# Patient Record
Sex: MALE | Race: WHITE | NOT HISPANIC OR LATINO | Employment: FULL TIME | URBAN - METROPOLITAN AREA
[De-identification: names, ages, dates, MRNs, and addresses within clinical notes are randomized per-mention and may not be internally consistent; named-entity substitution may affect disease eponyms.]

---

## 2022-08-24 ENCOUNTER — OFFICE VISIT (OUTPATIENT)
Dept: INTERNAL MEDICINE | Facility: CLINIC | Age: 52
End: 2022-08-24
Payer: COMMERCIAL

## 2022-08-24 VITALS
WEIGHT: 214.5 LBS | BODY MASS INDEX: 34.47 KG/M2 | HEART RATE: 60 BPM | DIASTOLIC BLOOD PRESSURE: 88 MMHG | OXYGEN SATURATION: 98 % | TEMPERATURE: 97 F | HEIGHT: 66 IN | SYSTOLIC BLOOD PRESSURE: 132 MMHG

## 2022-08-24 DIAGNOSIS — E29.1 MALE HYPOGONADISM: ICD-10-CM

## 2022-08-24 DIAGNOSIS — Z12.5 SCREENING FOR PROSTATE CANCER: ICD-10-CM

## 2022-08-24 DIAGNOSIS — K29.00 OTHER ACUTE GASTRITIS WITHOUT HEMORRHAGE: ICD-10-CM

## 2022-08-24 DIAGNOSIS — Z00.00 ANNUAL PHYSICAL EXAM: Primary | ICD-10-CM

## 2022-08-24 DIAGNOSIS — M72.2 PLANTAR FASCIITIS: ICD-10-CM

## 2022-08-24 DIAGNOSIS — E78.2 MODERATE MIXED HYPERLIPIDEMIA NOT REQUIRING STATIN THERAPY: ICD-10-CM

## 2022-08-24 DIAGNOSIS — Z11.3 SCREENING FOR STD (SEXUALLY TRANSMITTED DISEASE): ICD-10-CM

## 2022-08-24 DIAGNOSIS — Z12.11 SCREENING FOR COLON CANCER: ICD-10-CM

## 2022-08-24 PROCEDURE — 3079F PR MOST RECENT DIASTOLIC BLOOD PRESSURE 80-89 MM HG: ICD-10-PCS | Mod: CPTII,S$GLB,, | Performed by: FAMILY MEDICINE

## 2022-08-24 PROCEDURE — 99999 PR PBB SHADOW E&M-NEW PATIENT-LVL IV: CPT | Mod: PBBFAC,,, | Performed by: FAMILY MEDICINE

## 2022-08-24 PROCEDURE — 99386 PREV VISIT NEW AGE 40-64: CPT | Mod: S$GLB,,, | Performed by: FAMILY MEDICINE

## 2022-08-24 PROCEDURE — 1160F RVW MEDS BY RX/DR IN RCRD: CPT | Mod: CPTII,S$GLB,, | Performed by: FAMILY MEDICINE

## 2022-08-24 PROCEDURE — 1159F PR MEDICATION LIST DOCUMENTED IN MEDICAL RECORD: ICD-10-PCS | Mod: CPTII,S$GLB,, | Performed by: FAMILY MEDICINE

## 2022-08-24 PROCEDURE — 3008F PR BODY MASS INDEX (BMI) DOCUMENTED: ICD-10-PCS | Mod: CPTII,S$GLB,, | Performed by: FAMILY MEDICINE

## 2022-08-24 PROCEDURE — 3008F BODY MASS INDEX DOCD: CPT | Mod: CPTII,S$GLB,, | Performed by: FAMILY MEDICINE

## 2022-08-24 PROCEDURE — 99999 PR PBB SHADOW E&M-NEW PATIENT-LVL IV: ICD-10-PCS | Mod: PBBFAC,,, | Performed by: FAMILY MEDICINE

## 2022-08-24 PROCEDURE — 1160F PR REVIEW ALL MEDS BY PRESCRIBER/CLIN PHARMACIST DOCUMENTED: ICD-10-PCS | Mod: CPTII,S$GLB,, | Performed by: FAMILY MEDICINE

## 2022-08-24 PROCEDURE — 99386 PR PREVENTIVE VISIT,NEW,40-64: ICD-10-PCS | Mod: S$GLB,,, | Performed by: FAMILY MEDICINE

## 2022-08-24 PROCEDURE — 3075F PR MOST RECENT SYSTOLIC BLOOD PRESS GE 130-139MM HG: ICD-10-PCS | Mod: CPTII,S$GLB,, | Performed by: FAMILY MEDICINE

## 2022-08-24 PROCEDURE — 3079F DIAST BP 80-89 MM HG: CPT | Mod: CPTII,S$GLB,, | Performed by: FAMILY MEDICINE

## 2022-08-24 PROCEDURE — 3075F SYST BP GE 130 - 139MM HG: CPT | Mod: CPTII,S$GLB,, | Performed by: FAMILY MEDICINE

## 2022-08-24 PROCEDURE — 1159F MED LIST DOCD IN RCRD: CPT | Mod: CPTII,S$GLB,, | Performed by: FAMILY MEDICINE

## 2022-08-24 RX ORDER — ANASTROZOLE 1 MG/1
TABLET ORAL
COMMUNITY
Start: 2022-08-22

## 2022-08-24 RX ORDER — IRON,FM,PS/FOLIC/B,C18/L.CASEI 130-1.25MG
1 CAPSULE ORAL 2 TIMES DAILY
COMMUNITY
Start: 2022-07-24

## 2022-08-24 RX ORDER — POLYETHYLENE GLYCOL 3350, SODIUM SULFATE ANHYDROUS, SODIUM BICARBONATE, SODIUM CHLORIDE, POTASSIUM CHLORIDE 236; 22.74; 6.74; 5.86; 2.97 G/4L; G/4L; G/4L; G/4L; G/4L
4 POWDER, FOR SOLUTION ORAL ONCE
Qty: 4000 ML | Refills: 0 | Status: SHIPPED | OUTPATIENT
Start: 2022-08-24 | End: 2022-08-24

## 2022-08-24 RX ORDER — PANTOPRAZOLE SODIUM 40 MG/1
40 TABLET, DELAYED RELEASE ORAL DAILY
Qty: 90 TABLET | Refills: 0 | Status: SHIPPED | OUTPATIENT
Start: 2022-08-24 | End: 2022-11-22

## 2022-08-24 NOTE — PROGRESS NOTES
Subjective:   Patient ID:  Albino Ying is a 51 y.o. male.    Chief Complaint:  Establish Care    Past Medical History:   Diagnosis Date    Male hypogonadism 8/24/2022    Moderate mixed hyperlipidemia not requiring statin therapy 8/24/2022     History reviewed. No pertinent surgical history.     Family History   Problem Relation Age of Onset    Gout Father     Cancer Paternal Uncle     Prostate cancer Paternal Grandfather      Review of patient's allergies indicates:  No Known Allergies    Current Outpatient Medications:     anastrozole (ARIMIDEX) 1 mg Tab, Take by mouth., Disp: , Rfl:     FUSION PLUS 130 mg iron -1,250 mcg Cap, Take 1 capsule by mouth 2 (two) times daily., Disp: , Rfl:     pantoprazole (PROTONIX) 40 MG tablet, Take 1 tablet (40 mg total) by mouth once daily., Disp: 90 tablet, Rfl: 0     Presents to establish care  Previous PCP Dr. Holly.  Also followed by Urology.    Last annual physical exam March 2021   CBC with mildly increased hemoglobin 52.9 likely due to testosterone supplementation, otherwise normal   CMP normal   Lipid panel with LDL 1/8/2010 year cardiovascular risk 6%   TSH normal   Testosterone mildly increased above upper limits of normal.  PSA normal     On testosterone pellets and Arimidex for male hypogonadism  Never recommended any treatment with statin for his cholesterols  No previous colon cancer screening  Tetanus booster up-to-date  Reports completed primary COVID vaccine series but no booster   No recent flu vaccines   No previous hepatitis-C or HIV screening     Complaint/concern today is intermittent epigastric and left upper quadrant pain with certain meals   Questions if has gastritis or ulcer   Weight has been stable   Does not have any suspicions for blood in stool   Denies any recent change in his diet  Has not identified any specific trigger    Also has right heel pain.    Chronic.  Plantar side.  Worse when wears flip-flops and prolonged  "walking.          Review of Systems   Constitutional: Negative for chills, fatigue and fever.   HENT: Negative for congestion, dental problem, ear discharge, ear pain, postnasal drip, rhinorrhea, sinus pressure, sinus pain, sore throat and trouble swallowing.    Eyes: Negative for pain, redness and visual disturbance.   Respiratory: Negative for cough, chest tightness, shortness of breath and wheezing.    Cardiovascular: Negative for chest pain, palpitations and leg swelling.   Gastrointestinal: Positive for abdominal pain. Negative for abdominal distention, constipation, diarrhea, nausea and vomiting.   Endocrine: Negative for polydipsia, polyphagia and polyuria.   Genitourinary: Negative for difficulty urinating, dysuria, flank pain, frequency, hematuria and urgency.   Musculoskeletal: Negative for myalgias.   Skin: Negative for rash.   Neurological: Negative for dizziness, weakness, light-headedness and headaches.   Hematological: Negative for adenopathy.   Psychiatric/Behavioral: Negative for sleep disturbance. The patient is not nervous/anxious.        Objective:   /88 (BP Location: Left arm, Patient Position: Sitting, BP Method: Large (Manual))   Pulse 60   Temp 97 °F (36.1 °C) (Tympanic)   Ht 5' 6" (1.676 m)   Wt 97.3 kg (214 lb 8.1 oz)   SpO2 98%   BMI 34.62 kg/m²     Physical Exam  Vitals and nursing note reviewed.   Constitutional:       Appearance: Normal appearance. He is well-developed. He is obese.   HENT:      Right Ear: Hearing, tympanic membrane, ear canal and external ear normal.      Left Ear: Hearing, tympanic membrane, ear canal and external ear normal.      Nose: No mucosal edema, congestion or rhinorrhea.      Right Turbinates: Not enlarged or swollen.      Left Turbinates: Not enlarged or swollen.      Right Sinus: No maxillary sinus tenderness or frontal sinus tenderness.      Left Sinus: No maxillary sinus tenderness or frontal sinus tenderness.      Mouth/Throat:      Mouth: No " oral lesions.      Pharynx: Oropharynx is clear. Uvula midline.      Tonsils: No tonsillar exudate.   Eyes:      General: No scleral icterus.        Right eye: No discharge.         Left eye: No discharge.      Conjunctiva/sclera: Conjunctivae normal.      Right eye: Right conjunctiva is not injected.      Left eye: Left conjunctiva is not injected.   Neck:      Thyroid: No thyroid mass, thyromegaly or thyroid tenderness.      Vascular: No JVD.   Cardiovascular:      Rate and Rhythm: Normal rate and regular rhythm.      Pulses:           Radial pulses are 2+ on the right side and 2+ on the left side.      Heart sounds: Normal heart sounds. No murmur heard.    No friction rub. No gallop.   Pulmonary:      Effort: Pulmonary effort is normal. No accessory muscle usage or respiratory distress.      Breath sounds: Normal breath sounds.   Abdominal:      General: There is no distension.      Palpations: Abdomen is soft.      Tenderness: There is no abdominal tenderness.   Musculoskeletal:      Right lower leg: No edema.      Left lower leg: No edema.      Right foot: Normal.      Comments:   Right foot with tenderness at plantar fascia insertion site After stretching, pain at site is decreased with palpation   Lymphadenopathy:      Cervical: No cervical adenopathy.   Skin:     Findings: No rash.   Neurological:      Mental Status: He is alert and oriented to person, place, and time.      Coordination: Coordination is intact.      Gait: Gait is intact.   Psychiatric:         Attention and Perception: Attention normal.         Mood and Affect: Mood normal.         Behavior: Behavior is cooperative.         Cognition and Memory: Cognition normal.       Assessment:     1. Annual physical exam    2. Screening for STD (sexually transmitted disease)    3. Screening for prostate cancer    4. Moderate mixed hyperlipidemia not requiring statin therapy    5. Male hypogonadism    6. Other acute gastritis without hemorrhage    7.  Plantar fasciitis    8. Screening for colon cancer      Plan:   Annual physical exam  Screening for STD (sexually transmitted disease)  Screening for prostate cancer  Screening for colon cancer  -     CBC Without Differential; Future; Expected date: 08/24/2022  -     Comprehensive Metabolic Panel; Future; Expected date: 08/24/2022  -     Lipid Panel; Future; Expected date: 08/24/2022  -     TSH; Future; Expected date: 08/24/2022  -     Hemoglobin A1C; Future; Expected date: 08/24/2022  -     PSA, Screening; Future; Expected date: 08/24/2022  -     Hepatitis C Antibody; Future; Expected date: 08/24/2022  -     HIV 1/2 Ag/Ab (4th Gen); Future; Expected date: 08/24/2022  -     RPR; Future; Expected date: 08/24/2022  -     Ambulatory referral/consult to Endo Procedure ; Future; Expected date: 08/25/2022  Blood pressure normal.  BMI 34.6.  Remainder exam stable.    Check labs.  Treat as indicated.    Colonoscopy ordered for colon cancer screening  Prostate cancer screening today  Tetanus booster up-to-date  Eligible for COVID booster   Recommend flu vaccine this season    Moderate mixed hyperlipidemia not requiring statin therapy  If 10 year cardiovascular risk greater than 10%, start statin  If 10 year cardiovascular risk less than 10%, okay to remain off medication     Male hypogonadism  Stable.  Symptoms controlled.    Continue per Urology     Other acute gastritis without hemorrhage  -     pantoprazole (PROTONIX) 40 MG tablet; Take 1 tablet (40 mg total) by mouth once daily.  Dispense: 90 tablet; Refill: 0  -     H. PYLORI ANTIBODY, IGG; Future; Expected date: 08/24/2022  Start Protonix 40 mg twice a day for 2 weeks, then decrease to Protonix 40 mg daily dose  If H pylori antibody positive, will treat with amoxicillin and Biaxin   If no improvement with treatment, will need EGD     Plantar fasciitis  Patient education/handout on plantar fasciitis   Over-the-counter Tylenol, avoid NSAIDs currently due to GI  issues  Provided stretching exercises   Recommend bilateral heel pads   If no improvement, return to clinic     Return to clinic 1 year for annual physical exam or sooner if needed

## 2022-10-05 ENCOUNTER — ANESTHESIA EVENT (OUTPATIENT)
Dept: ENDOSCOPY | Facility: HOSPITAL | Age: 52
End: 2022-10-05
Payer: COMMERCIAL

## 2022-10-06 ENCOUNTER — ANESTHESIA (OUTPATIENT)
Dept: ENDOSCOPY | Facility: HOSPITAL | Age: 52
End: 2022-10-06
Payer: COMMERCIAL

## 2022-10-06 ENCOUNTER — HOSPITAL ENCOUNTER (OUTPATIENT)
Facility: HOSPITAL | Age: 52
Discharge: HOME | End: 2022-10-06
Attending: INTERNAL MEDICINE | Admitting: INTERNAL MEDICINE
Payer: COMMERCIAL

## 2022-10-06 VITALS
HEART RATE: 64 BPM | TEMPERATURE: 98 F | WEIGHT: 210 LBS | RESPIRATION RATE: 12 BRPM | DIASTOLIC BLOOD PRESSURE: 92 MMHG | HEIGHT: 66 IN | SYSTOLIC BLOOD PRESSURE: 124 MMHG | BODY MASS INDEX: 33.75 KG/M2 | OXYGEN SATURATION: 96 %

## 2022-10-06 DIAGNOSIS — Z12.11 COLON CANCER SCREENING: ICD-10-CM

## 2022-10-06 PROCEDURE — 25000003 PHARM REV CODE 250: Performed by: NURSE ANESTHETIST, CERTIFIED REGISTERED

## 2022-10-06 PROCEDURE — 45380 PR COLONOSCOPY,BIOPSY: ICD-10-PCS | Mod: 33,59,, | Performed by: INTERNAL MEDICINE

## 2022-10-06 PROCEDURE — 37000009 HC ANESTHESIA EA ADD 15 MINS: Performed by: INTERNAL MEDICINE

## 2022-10-06 PROCEDURE — 45385 COLONOSCOPY W/LESION REMOVAL: CPT | Mod: PT | Performed by: INTERNAL MEDICINE

## 2022-10-06 PROCEDURE — 37000008 HC ANESTHESIA 1ST 15 MINUTES: Performed by: INTERNAL MEDICINE

## 2022-10-06 PROCEDURE — 27201012 HC FORCEPS, HOT/COLD, DISP: Performed by: INTERNAL MEDICINE

## 2022-10-06 PROCEDURE — 27201089 HC SNARE, DISP (ANY): Performed by: INTERNAL MEDICINE

## 2022-10-06 PROCEDURE — 88305 TISSUE EXAM BY PATHOLOGIST: ICD-10-PCS | Mod: 26,,, | Performed by: PATHOLOGY

## 2022-10-06 PROCEDURE — 45380 COLONOSCOPY AND BIOPSY: CPT | Mod: 33,59,, | Performed by: INTERNAL MEDICINE

## 2022-10-06 PROCEDURE — 88305 TISSUE EXAM BY PATHOLOGIST: CPT | Performed by: PATHOLOGY

## 2022-10-06 PROCEDURE — 63600175 PHARM REV CODE 636 W HCPCS: Performed by: NURSE ANESTHETIST, CERTIFIED REGISTERED

## 2022-10-06 PROCEDURE — 45380 COLONOSCOPY AND BIOPSY: CPT | Mod: PT,59 | Performed by: INTERNAL MEDICINE

## 2022-10-06 PROCEDURE — 88305 TISSUE EXAM BY PATHOLOGIST: CPT | Mod: 26,,, | Performed by: PATHOLOGY

## 2022-10-06 PROCEDURE — 45385 COLONOSCOPY W/LESION REMOVAL: CPT | Mod: 33,,, | Performed by: INTERNAL MEDICINE

## 2022-10-06 PROCEDURE — 45385 PR COLONOSCOPY,REMV LESN,SNARE: ICD-10-PCS | Mod: 33,,, | Performed by: INTERNAL MEDICINE

## 2022-10-06 RX ORDER — SODIUM CHLORIDE, SODIUM LACTATE, POTASSIUM CHLORIDE, CALCIUM CHLORIDE 600; 310; 30; 20 MG/100ML; MG/100ML; MG/100ML; MG/100ML
INJECTION, SOLUTION INTRAVENOUS CONTINUOUS PRN
Status: DISCONTINUED | OUTPATIENT
Start: 2022-10-06 | End: 2022-10-06

## 2022-10-06 RX ORDER — LIDOCAINE HYDROCHLORIDE 10 MG/ML
INJECTION, SOLUTION EPIDURAL; INFILTRATION; INTRACAUDAL; PERINEURAL
Status: DISCONTINUED | OUTPATIENT
Start: 2022-10-06 | End: 2022-10-06

## 2022-10-06 RX ORDER — PROPOFOL 10 MG/ML
VIAL (ML) INTRAVENOUS
Status: DISCONTINUED | OUTPATIENT
Start: 2022-10-06 | End: 2022-10-06

## 2022-10-06 RX ADMIN — SODIUM CHLORIDE, SODIUM LACTATE, POTASSIUM CHLORIDE, AND CALCIUM CHLORIDE: .6; .31; .03; .02 INJECTION, SOLUTION INTRAVENOUS at 07:10

## 2022-10-06 RX ADMIN — PROPOFOL 20 MG: 10 INJECTION, EMULSION INTRAVENOUS at 08:10

## 2022-10-06 RX ADMIN — PROPOFOL 30 MG: 10 INJECTION, EMULSION INTRAVENOUS at 07:10

## 2022-10-06 RX ADMIN — LIDOCAINE HYDROCHLORIDE 50 MG: 10 INJECTION, SOLUTION EPIDURAL; INFILTRATION; INTRACAUDAL; PERINEURAL at 07:10

## 2022-10-06 RX ADMIN — PROPOFOL 100 MG: 10 INJECTION, EMULSION INTRAVENOUS at 07:10

## 2022-10-06 NOTE — PROVATION PATIENT INSTRUCTIONS
Discharge Summary/Instructions after an Endoscopic Procedure  Patient Name: Albino Ying  Patient MRN: [id number]  Patient YOB: 1970 Thursday, October 6, 2022 Los Ballard MD  Dear patient,  As a result of recent federal legislation (The Federal Cures Act), you may   receive lab or pathology results from your procedure in your [facility]   account before your physician is able to contact you. Your physician or   their representative will relay the results to you with their   recommendations at their soonest availability.  Thank you,  RESTRICTIONS:  During your procedure today, you received medications for sedation.  These   medications may affect your judgment, balance and coordination.  Therefore,   for 24 hours, you have the following restrictions:   - DO NOT drive a car, operate machinery, make legal/financial decisions,   sign important papers or drink alcohol.    ACTIVITY:  Today: no heavy lifting, straining or running due to procedural   sedation/anesthesia.  The following day: return to full activity including work.  DIET:  Eat and drink normally unless instructed otherwise.     TREATMENT FOR COMMON SIDE EFFECTS:  - Mild abdominal pain, nausea, belching, bloating or excessive gas:  rest,   eat lightly and use a heating pad.  - Sore Throat: treat with throat lozenges and/or gargle with warm salt   water.  - Because air was used during the procedure, expelling large amounts of air   from your rectum or belching is normal.  - If a bowel prep was taken, you may not have a bowel movement for 1-3 days.    This is normal.  SYMPTOMS TO WATCH FOR AND REPORT TO YOUR PHYSICIAN:  1. Abdominal pain or bloating, other than gas cramps.  2. Chest pain.  3. Back pain.  4. Signs of infection such as: chills or fever occurring within 24 hours   after the procedure.  5. Rectal bleeding, which would show as bright red, maroon, or black stools.   (A tablespoon of blood from the rectum is not serious, especially  if   hemorrhoids are present.)  6. Vomiting.  7. Weakness or dizziness.  GO DIRECTLY TO THE NEAREST EMERGENCY ROOM IF YOU HAVE ANY OF THE FOLLOWING:      Difficulty breathing              Chills and/or fever over 101 F   Persistent vomiting and/or vomiting blood   Severe abdominal pain   Severe chest pain   Black, tarry stools   Bleeding- more than one tablespoon   Any other symptom or condition that you feel may need urgent attention  Your doctor recommends these additional instructions:  If any biopsies were taken, your doctors clinic will contact you in 1 to 2   weeks with any results.  - Discharge patient to home (via wheelchair).   - Resume previous diet.   - Continue present medications.   - Await pathology results.   - Repeat colonoscopy in 5 years for surveillance.   - Return to referring physician as previously scheduled.  For questions, problems or results please call your physician Los Ballard MD at Work:  [phone]  If you have any questions about the above instructions, call the GI   department at [phone] or call the endoscopy unit at [phone]   from 7am until 3 pm.  [facility] [place], EMERGENCY ROOM PHONE NUMBER:   [phone]  IF A COMPLICATION OR EMERGENCY SITUATION ARISES AND YOU ARE UNABLE TO REACH   YOUR PHYSICIAN - GO DIRECTLY TO THE EMERGENCY ROOM.  I have read or have had read to me these discharge instructions for my   procedure and have received a written copy.  I understand these   instructions and will follow-up with my physician if I have any questions.     __________________________________       _____________________________________  Nurse Signature                                          Patient/Designated   Responsible Party Signature  MD Los Raymond MD  10/6/2022 8:13:29 AM  This report has been verified and signed electronically.  Dear patient,  As a result of recent federal legislation (The Federal  Cures Act), you may   receive lab or pathology results from your procedure in your [facility]   account before your physician is able to contact you. Your physician or   their representative will relay the results to you with their   recommendations at their soonest availability.  Thank you,  PROVATION

## 2022-10-06 NOTE — DISCHARGE SUMMARY
[facility] - Endoscopy (Hospital)  Discharge Note  Short Stay    Procedure(s) (LRB):  COLONOSCOPY (N/A)      OUTCOME: Patient tolerated treatment/procedure well without complication and is now ready for discharge.    DISPOSITION: Home or Self Care    FINAL DIAGNOSIS:  Colon cancer screening    FOLLOWUP: With primary care provider    DISCHARGE INSTRUCTIONS:  No discharge procedures on file.

## 2022-10-06 NOTE — H&P
Short Stay Endoscopy History and Physical    PCP - Carl Gipson MD  Referring Physician - Carl Gipson MD  [id number] [facility]  [place] [address]    Procedure - Colonoscopy  ASA - per anesthesia  Mallampati - per anesthesia  History of Anesthesia problems - no  Family history Anesthesia problems -  no   Plan of anesthesia - General    HPI  52 y.o. male  Reason for procedure: colon cancer screen, Average risk      ROS:  Constitutional: No fevers, chills, No weight loss  CV: No chest pain  Pulm: No cough, No shortness of breath  GI: see HPI    Medical History:  has a past medical history of Male hypogonadism (8/24/2022) and Moderate mixed hyperlipidemia not requiring statin therapy (8/24/2022).    Surgical History:  has no past surgical history on file.    Family History: family history includes Cancer in his paternal uncle; Gout in his father; Prostate cancer in his paternal grandfather..    Social History:  reports that he has never smoked. He has never used smokeless tobacco. He reports current alcohol use.    Review of patient's allergies indicates:  No Known Allergies    Medications:   Medications Prior to Admission   Medication Sig Dispense Refill Last Dose    anastrozole (ARIMIDEX) 1 mg Tab Take by mouth.   10/5/2022    FUSION PLUS 130 mg iron -1,250 mcg Cap Take 1 capsule by mouth 2 (two) times daily.   Past Week    pantoprazole (PROTONIX) 40 MG tablet Take 1 tablet (40 mg total) by mouth once daily. 90 tablet 0 Past Month       Physical Exam:    Vital Signs:   Vitals:    10/06/22 0708   BP: 138/80   Pulse: 73   Resp: 16   Temp: 98.5 °F (36.9 °C)       General Appearance: Well appearing in no acute distress  Abdomen: Soft, non tender, non distended with normal bowel sounds, no masses    Labs:  Lab Results   Component Value Date    WBC 6.20 08/24/2022    HGB 18.3 (H) 08/24/2022    HCT 54.3 (H) 08/24/2022     08/24/2022    CHOL 186 08/24/2022    TRIG 327 (H) 08/24/2022    HDL 26 (L)  08/24/2022    ALT 27 08/24/2022    AST 27 08/24/2022     08/24/2022    K 4.7 08/24/2022     08/24/2022    CREATININE 1.0 08/24/2022    BUN 11 08/24/2022    CO2 29 08/24/2022    TSH 0.950 08/24/2022    PSA 1.5 08/24/2022    HGBA1C 4.8 08/24/2022       I have explained the risks and benefits of this endoscopic procedure to the patient including but not limited to bleeding, inflammation, infection, perforation, and death.    SEDATION PLAN: per anesthesia      History reviewed, vital signs satisfactory, cardiopulmonary status satisfactory, sedation options, risks and plans have been discussed with the patient  All their questions were answered and the patient agrees to the sedation procedures as planned and the patient is deemed an appropriate candidate for the sedation as planned.    Procedure explained to patient, informed consent obtained and placed in chart.        Los Ballard MD

## 2022-10-06 NOTE — ANESTHESIA PREPROCEDURE EVALUATION
10/06/2022  Albino Ying is a 52 y.o., male.      Pre-op Assessment    I have reviewed the Patient Summary Reports.     I have reviewed the Nursing Notes. I have reviewed the NPO Status.   I have reviewed the Medications.     Review of Systems  Anesthesia Hx:  No problems with previous Anesthesia  Denies Family Hx of Anesthesia complications.   Denies Personal Hx of Anesthesia complications.   Social:  Social Alcohol Use, Non-Smoker    Hematology/Oncology:  Hematology Normal   Oncology Normal     Cardiovascular:   Denies Hypertension.  Denies MI.   Denies CABG/stent.   Denies CHF. hyperlipidemia    Pulmonary:   Denies COPD.  Denies Asthma.  Denies Sleep Apnea.    Renal/:  Renal/ Normal     Hepatic/GI:   Bowel Prep. Denies GERD. Denies Liver Disease.  Denies Hepatitis.    Musculoskeletal:  Musculoskeletal Normal    Neurological:   Denies CVA. Denies Seizures.    Endocrine:   Denies Diabetes. Denies Hypothyroidism. Denies Hyperthyroidism.        Physical Exam  General: Well nourished    Airway:  Mallampati: II   Mouth Opening: Normal    Dental:  Intact    Chest/Lungs:  Clear to auscultation, Normal Respiratory Rate    Heart:  Rate: Normal  Rhythm: Regular Rhythm        Anesthesia Plan  Type of Anesthesia, risks & benefits discussed:    Anesthesia Type: MAC  Intra-op Monitoring Plan: Standard ASA Monitors  Induction:  IV  Informed Consent: Informed consent signed with the Patient and all parties understand the risks and agree with anesthesia plan.  All questions answered.   ASA Score: 2  Day of Surgery Review of History & Physical: H&P Update referred to the surgeon/provider.    Ready For Surgery From Anesthesia Perspective.     .

## 2022-10-06 NOTE — TRANSFER OF CARE
"Anesthesia Transfer of Care Note    Patient: Albino Ying    Procedure(s) Performed: Procedure(s) (LRB):  COLONOSCOPY (N/A)    Patient location: PACU    Anesthesia Type: MAC    Transport from OR: Transported from OR on room air with adequate spontaneous ventilation    Post pain: adequate analgesia    Post assessment: no apparent anesthetic complications and tolerated procedure well    Post vital signs: stable    Level of consciousness: sedated    Nausea/Vomiting: no nausea/vomiting    Complications: none    Transfer of care protocol was followed      Last vitals:   Visit Vitals  /80 (BP Location: Left arm, Patient Position: Lying)   Pulse 73   Temp 36.9 °C (98.5 °F) (Temporal)   Resp 16   Ht 5' 6" (1.676 m)   Wt 95.3 kg (210 lb)   SpO2 (!) 94%   BMI 33.89 kg/m²     "

## 2022-10-11 LAB
FINAL PATHOLOGIC DIAGNOSIS: NORMAL
GROSS: NORMAL
Lab: NORMAL

## 2025-02-25 ENCOUNTER — OFFICE VISIT (OUTPATIENT)
Dept: INTERNAL MEDICINE | Facility: CLINIC | Age: 55
End: 2025-02-25
Payer: COMMERCIAL

## 2025-02-25 ENCOUNTER — RESULTS FOLLOW-UP (OUTPATIENT)
Dept: INTERNAL MEDICINE | Facility: CLINIC | Age: 55
End: 2025-02-25

## 2025-02-25 ENCOUNTER — HOSPITAL ENCOUNTER (OUTPATIENT)
Dept: RADIOLOGY | Facility: HOSPITAL | Age: 55
Discharge: HOME | End: 2025-02-25
Attending: FAMILY MEDICINE
Payer: COMMERCIAL

## 2025-02-25 VITALS
HEART RATE: 68 BPM | DIASTOLIC BLOOD PRESSURE: 70 MMHG | WEIGHT: 198.44 LBS | HEIGHT: 66 IN | SYSTOLIC BLOOD PRESSURE: 126 MMHG | OXYGEN SATURATION: 98 % | BODY MASS INDEX: 31.89 KG/M2

## 2025-02-25 DIAGNOSIS — M79.645 PAIN OF LEFT MIDDLE FINGER: ICD-10-CM

## 2025-02-25 DIAGNOSIS — M79.642 LEFT HAND PAIN: ICD-10-CM

## 2025-02-25 DIAGNOSIS — K29.00 OTHER ACUTE GASTRITIS WITHOUT HEMORRHAGE: ICD-10-CM

## 2025-02-25 DIAGNOSIS — M79.645 PAIN OF LEFT MIDDLE FINGER: Primary | ICD-10-CM

## 2025-02-25 PROBLEM — Z12.11 COLON CANCER SCREENING: Status: RESOLVED | Noted: 2022-10-06 | Resolved: 2025-02-25

## 2025-02-25 PROCEDURE — 99999 PR PBB SHADOW E&M-EST. PATIENT-LVL III: CPT | Mod: PBBFAC,,, | Performed by: FAMILY MEDICINE

## 2025-02-25 PROCEDURE — 3074F SYST BP LT 130 MM HG: CPT | Mod: CPTII,S$GLB,, | Performed by: FAMILY MEDICINE

## 2025-02-25 PROCEDURE — 3078F DIAST BP <80 MM HG: CPT | Mod: CPTII,S$GLB,, | Performed by: FAMILY MEDICINE

## 2025-02-25 PROCEDURE — 73140 X-RAY EXAM OF FINGER(S): CPT | Mod: 26,LT,, | Performed by: RADIOLOGY

## 2025-02-25 PROCEDURE — 1160F RVW MEDS BY RX/DR IN RCRD: CPT | Mod: CPTII,S$GLB,, | Performed by: FAMILY MEDICINE

## 2025-02-25 PROCEDURE — 1159F MED LIST DOCD IN RCRD: CPT | Mod: CPTII,S$GLB,, | Performed by: FAMILY MEDICINE

## 2025-02-25 PROCEDURE — 3008F BODY MASS INDEX DOCD: CPT | Mod: CPTII,S$GLB,, | Performed by: FAMILY MEDICINE

## 2025-02-25 PROCEDURE — 73130 X-RAY EXAM OF HAND: CPT | Mod: TC,LT

## 2025-02-25 PROCEDURE — 99214 OFFICE O/P EST MOD 30 MIN: CPT | Mod: S$GLB,,, | Performed by: FAMILY MEDICINE

## 2025-02-25 PROCEDURE — 73140 X-RAY EXAM OF FINGER(S): CPT | Mod: TC,LT

## 2025-02-25 RX ORDER — PREDNISONE 50 MG/1
50 TABLET ORAL DAILY
Qty: 5 TABLET | Refills: 0 | Status: SHIPPED | OUTPATIENT
Start: 2025-02-25 | End: 2025-03-02

## 2025-02-25 RX ORDER — VALACYCLOVIR HYDROCHLORIDE 1 G/1
2000 TABLET, FILM COATED ORAL EVERY 12 HOURS
COMMUNITY
Start: 2025-02-04

## 2025-02-25 RX ORDER — PANTOPRAZOLE SODIUM 40 MG/1
40 TABLET, DELAYED RELEASE ORAL DAILY
Qty: 90 TABLET | Refills: 3 | Status: SHIPPED | OUTPATIENT
Start: 2025-02-25 | End: 2026-02-25

## 2025-02-25 NOTE — PROGRESS NOTES
"Subjective:   Patient ID: Albino Ying is a 54 y.o. male.  Chief Complaint:  Joint Pain    Presents for evaluation of left hand and left middle finger pain   Denies trauma   Denies any stiffness, swelling, or decreased range of motion   Denies any redness, warmth, or  Denies any popping, snapping, clicking, locking, or triggering   Just feels like the joint space at the end of the finger is stuck and can not be relieved of pressure   Pain is greatest in the webspace between 3rd and 4th finger  Chronic greater than 2 weeks and getting worse  No decreased function yet   No previous known autoimmune arthritis issue  Has not tried any medications at home to help with pain    Review of Systems   Musculoskeletal:  Positive for arthralgias. Negative for back pain, gait problem, joint swelling, myalgias, neck pain and neck stiffness.   Skin:  Negative for color change, pallor, rash and wound.   Neurological:  Negative for tremors, weakness and numbness.   Hematological:  Does not bruise/bleed easily.     Objective:   /70 (BP Location: Right arm, Patient Position: Sitting)   Pulse 68   Ht 5' 6" (1.676 m)   Wt 90 kg (198 lb 6.6 oz)   SpO2 98%   BMI 32.02 kg/m²     Physical Exam  Musculoskeletal:      Left forearm: Normal.      Left wrist: Normal. No swelling. Normal range of motion.      Left hand: Tenderness (Webspace between 3rd and 4th digit) present. No swelling, deformity or bony tenderness. Normal range of motion. Normal strength. Normal sensation. There is no disruption of two-point discrimination. Normal capillary refill.      Comments:   No obvious abnormality   No redness or warmth  No palpable nodule   No triggering  No decreased function       Assessment:       ICD-10-CM ICD-9-CM   1. Pain of left middle finger  M79.645 729.5   2. Left hand pain  M79.642 729.5   3. Other acute gastritis without hemorrhage  K29.00 535.00     Plan:   Pain of left middle finger  Left hand pain  -     X-Ray Hand 3 " View Left; Future; Expected date: 02/25/2025  -     X-Ray Finger 2 or More Views Left; Future; Expected date: 02/25/2025  -     predniSONE (DELTASONE) 50 MG Tab; Take 1 tablet (50 mg total) by mouth once daily. for 5 days  Dispense: 5 tablet; Refill: 0  -     CBC auto differential; Future; Expected date: 02/25/2025  -     Comprehensive metabolic panel; Future; Expected date: 02/25/2025  -     Sedimentation rate; Future; Expected date: 02/25/2025  -     C-reactive protein; Future; Expected date: 02/25/2025  -     ALLAN; Future; Expected date: 02/25/2025  -     Rheumatoid factor; Future; Expected date: 02/25/2025  -     Cyclic citrul peptide antibody, IgG; Future; Expected date: 02/25/2025  -     Sjogrens syndrome-A extractable nuclear antibody; Future; Expected date: 02/25/2025  -     Hepatitis C antibody; Future; Expected date: 02/25/2025  -     Uric Acid; Future; Expected date: 02/25/2025  Unclear etiology   Possible neuroma  Check imaging   Check labs  Short course treatment with prednisone  Additional treatment as indicated if labs or x-ray abnormal  If labs and x-ray normal and no improvement, consider treatment with gabapentin or amitriptyline     Other acute gastritis without hemorrhage  -     pantoprazole (PROTONIX) 40 MG tablet; Take 1 tablet (40 mg total) by mouth once daily.  Dispense: 90 tablet; Refill: 3  Restart Protonix 40 mg daily while taking prednisone to avoid recurrent gastritis    Return to clinic 2 weeks for annual exam and follow-up

## 2025-04-30 ENCOUNTER — OFFICE VISIT (OUTPATIENT)
Dept: INTERNAL MEDICINE | Facility: CLINIC | Age: 55
End: 2025-04-30
Payer: COMMERCIAL

## 2025-04-30 ENCOUNTER — LAB VISIT (OUTPATIENT)
Dept: LAB | Facility: HOSPITAL | Age: 55
End: 2025-04-30
Attending: FAMILY MEDICINE
Payer: COMMERCIAL

## 2025-04-30 VITALS
SYSTOLIC BLOOD PRESSURE: 120 MMHG | OXYGEN SATURATION: 97 % | HEART RATE: 60 BPM | HEIGHT: 66 IN | DIASTOLIC BLOOD PRESSURE: 86 MMHG | BODY MASS INDEX: 31.85 KG/M2 | TEMPERATURE: 97 F | WEIGHT: 198.19 LBS

## 2025-04-30 DIAGNOSIS — R41.840 DIFFICULTY CONCENTRATING: ICD-10-CM

## 2025-04-30 DIAGNOSIS — Z11.3 SCREENING FOR STD (SEXUALLY TRANSMITTED DISEASE): ICD-10-CM

## 2025-04-30 DIAGNOSIS — E78.2 MODERATE MIXED HYPERLIPIDEMIA NOT REQUIRING STATIN THERAPY: ICD-10-CM

## 2025-04-30 DIAGNOSIS — Z12.5 SCREENING FOR PROSTATE CANCER: ICD-10-CM

## 2025-04-30 DIAGNOSIS — E29.1 MALE HYPOGONADISM: ICD-10-CM

## 2025-04-30 DIAGNOSIS — Z00.00 ANNUAL PHYSICAL EXAM: ICD-10-CM

## 2025-04-30 DIAGNOSIS — K21.9 GASTROESOPHAGEAL REFLUX DISEASE WITHOUT ESOPHAGITIS: ICD-10-CM

## 2025-04-30 DIAGNOSIS — Z00.00 ANNUAL PHYSICAL EXAM: Primary | ICD-10-CM

## 2025-04-30 LAB
ALBUMIN SERPL BCP-MCNC: 4 G/DL (ref 3.5–5.2)
ALP SERPL-CCNC: 74 UNIT/L (ref 40–150)
ALT SERPL W/O P-5'-P-CCNC: 21 UNIT/L (ref 10–44)
ANION GAP (OHS): 13 MMOL/L (ref 8–16)
AST SERPL-CCNC: 26 UNIT/L (ref 11–45)
BILIRUB SERPL-MCNC: 0.4 MG/DL (ref 0.1–1)
BUN SERPL-MCNC: 14 MG/DL (ref 6–20)
CALCIUM SERPL-MCNC: 9.6 MG/DL (ref 8.7–10.5)
CHLORIDE SERPL-SCNC: 102 MMOL/L (ref 95–110)
CHOLEST SERPL-MCNC: 209 MG/DL (ref 120–199)
CHOLEST/HDLC SERPL: 8 {RATIO} (ref 2–5)
CO2 SERPL-SCNC: 25 MMOL/L (ref 23–29)
CREAT SERPL-MCNC: 0.9 MG/DL (ref 0.5–1.4)
EAG (OHS): 105 MG/DL (ref 68–131)
ERYTHROCYTE [DISTWIDTH] IN BLOOD BY AUTOMATED COUNT: 15.8 % (ref 11.5–14.5)
GFR SERPLBLD CREATININE-BSD FMLA CKD-EPI: >60 ML/MIN/1.73/M2
GLUCOSE SERPL-MCNC: 74 MG/DL (ref 70–110)
HBA1C MFR BLD: 5.3 % (ref 4–5.6)
HCT VFR BLD AUTO: 47.9 % (ref 40–54)
HCV AB SERPL QL IA: NORMAL
HDLC SERPL-MCNC: 26 MG/DL (ref 40–75)
HDLC SERPL: 12.4 % (ref 20–50)
HGB BLD-MCNC: 15.3 GM/DL (ref 14–18)
HIV 1+2 AB+HIV1 P24 AG SERPL QL IA: NORMAL
LDLC SERPL CALC-MCNC: ABNORMAL MG/DL
MCH RBC QN AUTO: 25.8 PG (ref 27–31)
MCHC RBC AUTO-ENTMCNC: 31.9 G/DL (ref 32–36)
MCV RBC AUTO: 81 FL (ref 82–98)
NONHDLC SERPL-MCNC: 183 MG/DL
PLATELET # BLD AUTO: 167 K/UL (ref 150–450)
PMV BLD AUTO: 12 FL (ref 9.2–12.9)
POTASSIUM SERPL-SCNC: 4.7 MMOL/L (ref 3.5–5.1)
PROT SERPL-MCNC: 7.6 GM/DL (ref 6–8.4)
PSA SERPL-MCNC: 2.01 NG/ML
RBC # BLD AUTO: 5.92 M/UL (ref 4.6–6.2)
SODIUM SERPL-SCNC: 140 MMOL/L (ref 136–145)
T PALLIDUM IGG+IGM SER QL: NORMAL
TESTOST SERPL-MCNC: 1362 NG/DL (ref 304–1227)
TRIGL SERPL-MCNC: 492 MG/DL (ref 30–150)
TSH SERPL-ACNC: 0.79 UIU/ML (ref 0.4–4)
WBC # BLD AUTO: 7.63 K/UL (ref 3.9–12.7)

## 2025-04-30 PROCEDURE — 84443 ASSAY THYROID STIM HORMONE: CPT

## 2025-04-30 PROCEDURE — 36415 COLL VENOUS BLD VENIPUNCTURE: CPT

## 2025-04-30 PROCEDURE — 86593 SYPHILIS TEST NON-TREP QUANT: CPT

## 2025-04-30 PROCEDURE — 87389 HIV-1 AG W/HIV-1&-2 AB AG IA: CPT

## 2025-04-30 PROCEDURE — 84403 ASSAY OF TOTAL TESTOSTERONE: CPT

## 2025-04-30 PROCEDURE — 99999 PR PBB SHADOW E&M-EST. PATIENT-LVL III: CPT | Mod: PBBFAC,,, | Performed by: FAMILY MEDICINE

## 2025-04-30 PROCEDURE — 83036 HEMOGLOBIN GLYCOSYLATED A1C: CPT

## 2025-04-30 PROCEDURE — 87661 TRICHOMONAS VAGINALIS AMPLIF: CPT

## 2025-04-30 PROCEDURE — 84153 ASSAY OF PSA TOTAL: CPT

## 2025-04-30 PROCEDURE — 80053 COMPREHEN METABOLIC PANEL: CPT

## 2025-04-30 PROCEDURE — 80061 LIPID PANEL: CPT

## 2025-04-30 PROCEDURE — 87591 N.GONORRHOEAE DNA AMP PROB: CPT

## 2025-04-30 PROCEDURE — 85027 COMPLETE CBC AUTOMATED: CPT

## 2025-04-30 PROCEDURE — 86803 HEPATITIS C AB TEST: CPT

## 2025-04-30 RX ORDER — ATOMOXETINE 40 MG/1
40 CAPSULE ORAL DAILY
Qty: 30 CAPSULE | Refills: 0 | Status: SHIPPED | OUTPATIENT
Start: 2025-04-30 | End: 2025-05-30

## 2025-04-30 RX ORDER — TESTOSTERONE GEL, 1% 10 MG/G
10 GEL TRANSDERMAL DAILY
COMMUNITY

## 2025-04-30 NOTE — PROGRESS NOTES
Subjective:   Patient ID: Albino Ying is a 54 y.o. male.  Chief Complaint:  Annual Exam and Difficulty Concentrating    Presents for annual exam    Medical history:   - GERD.  History gastritis.  Symptoms stable and well controlled on Protonix 40 mg daily.    - How testosterone.  Previously on testosterone injections and Arimidex.  Currently only on topical testosterone gel.  - Hyperlipidemia.  Low 10 year cardiovascular risk.  Not requiring statin therapy.  Denies any chest pain or claudication.    Health maintenance:   -Colon cancer screening up-to-date   - Needs prostate cancer screening   - Tetanus booster up-to-date   - Needs shingles vaccine series   - Needs COVID booster   - Needs pneumonia vaccine   - Needs flu vaccine    Main complaint today is difficulty focusing and concentrating   Symptoms date back to middle school/high school   Parents reluctant for him to take medication at that time.    With recent change in job, life, in additional stressors symptoms starting to make it difficult for him to function at work/home  Is interested in starting a medication, but prefers nonstimulant if possible    Also requested additional STD testing   No known exposure, current symptoms, or previous diagnosis or treatment for STD    Review of Systems   Constitutional:  Negative for chills, fatigue and fever.   HENT:  Negative for congestion, dental problem, ear discharge, ear pain, postnasal drip, rhinorrhea, sinus pressure, sinus pain, sore throat and trouble swallowing.    Eyes:  Negative for pain, discharge, redness and visual disturbance.   Respiratory:  Negative for cough, chest tightness, shortness of breath and wheezing.    Cardiovascular:  Negative for chest pain, palpitations and leg swelling.   Gastrointestinal:  Negative for abdominal pain, constipation, diarrhea, nausea and vomiting.   Endocrine: Negative for polydipsia, polyphagia and polyuria.   Genitourinary:  Negative for difficulty urinating,  "dysuria, flank pain, frequency, genital sores, hematuria, penile discharge, penile pain, testicular pain and urgency.   Musculoskeletal:  Negative for myalgias.   Skin:  Negative for rash.   Neurological:  Negative for dizziness, weakness, light-headedness and headaches.   Hematological:  Negative for adenopathy.   Psychiatric/Behavioral:  Positive for decreased concentration. Negative for agitation, behavioral problems, confusion, dysphoric mood, hallucinations, self-injury, sleep disturbance and suicidal ideas. The patient is not nervous/anxious and is not hyperactive.      Objective:   /86 (BP Location: Left arm, Patient Position: Sitting)   Pulse 60   Temp 96.6 °F (35.9 °C) (Tympanic)   Ht 5' 6" (1.676 m)   Wt 89.9 kg (198 lb 3.1 oz)   SpO2 97%   BMI 31.99 kg/m²     Physical Exam  Vitals and nursing note reviewed.   Constitutional:       Appearance: Normal appearance. He is well-developed. He is obese.   HENT:      Right Ear: Tympanic membrane and ear canal normal.      Left Ear: Tympanic membrane and ear canal normal.      Nose: No congestion or rhinorrhea.      Mouth/Throat:      Lips: No lesions.      Pharynx: Oropharynx is clear. Uvula midline. No pharyngeal swelling, oropharyngeal exudate or posterior oropharyngeal erythema.   Eyes:      Conjunctiva/sclera: Conjunctivae normal.      Right eye: Right conjunctiva is not injected.      Left eye: Left conjunctiva is not injected.   Neck:      Thyroid: No thyroid mass, thyromegaly or thyroid tenderness.   Cardiovascular:      Rate and Rhythm: Normal rate and regular rhythm.      Heart sounds: Normal heart sounds.   Pulmonary:      Effort: Pulmonary effort is normal.      Breath sounds: Normal breath sounds and air entry.   Abdominal:      General: There is no distension.      Palpations: Abdomen is soft.      Tenderness: There is no abdominal tenderness.   Lymphadenopathy:      Cervical: No cervical adenopathy.   Skin:     Findings: No lesion or " rash.   Neurological:      Mental Status: He is alert and oriented to person, place, and time.      Coordination: Coordination is intact.      Gait: Gait is intact.   Psychiatric:         Attention and Perception: Attention normal. He is attentive.         Mood and Affect: Mood and affect normal. Mood is not anxious or depressed.         Speech: Speech normal.         Behavior: Behavior normal. Behavior is not hyperactive. Behavior is cooperative.         Thought Content: Thought content normal.         Cognition and Memory: Cognition normal.         Judgment: Judgment normal.       Assessment:       ICD-10-CM ICD-9-CM   1. Annual physical exam  Z00.00 V70.0   2. Screening for prostate cancer  Z12.5 V76.44   3. Screening for STD (sexually transmitted disease)  Z11.3 V74.5   4. Difficulty concentrating  R41.840 799.51   5. Moderate mixed hyperlipidemia not requiring statin therapy  E78.2 272.2   6. Gastroesophageal reflux disease without esophagitis  K21.9 530.81   7. Male hypogonadism  E29.1 257.2     Plan:   Annual physical exam  Screening for prostate cancer  -     CBC Without Differential; Future; Expected date: 04/30/2025  -     Comprehensive Metabolic Panel; Future; Expected date: 04/30/2025  -     Lipid Panel; Future; Expected date: 04/30/2025  -     TSH; Future; Expected date: 04/30/2025  -     Hemoglobin A1C; Future; Expected date: 04/30/2025  -     PSA, Screening; Future; Expected date: 04/30/2025  -     Testosterone,Total; Future; Expected date: 04/30/2025  Blood pressure normal.  BMI 32.    Check labs.  Treat as indicated.    Colon cancer screening up-to-date   Prostate cancer screening today   Tetanus booster up-to-date   Declines other recommended vaccines     Screening for STD (sexually transmitted disease)  -     C. trachomatis/N. gonorrhoeae by AMP DNA; Future; Expected date: 04/30/2025  -     HIV 1/2 Ag/Ab (4th Gen); Future; Expected date: 04/30/2025  -     Hepatitis C Antibody; Future; Expected  date: 04/30/2025  -     Treponema Pallidium Antibodies IgG, IgM; Future; Expected date: 04/30/2025  -     Trichomonas vaginalis, RNA, Qual; Future; Expected date: 04/30/2025  Labs as above, treat as indicated.  Discussed safe sex practices to minimize risk of STD exposure.    Difficulty concentrating  -     atomoxetine (STRATTERA) 40 MG capsule; Take 1 capsule (40 mg total) by mouth once daily.  Dispense: 30 capsule; Refill: 0  If labs normal, trial of Strattera 40 mg daily     Moderate mixed hyperlipidemia not requiring statin therapy  Asymptomatic   Check lipid panel   Start statin if indicated    Gastroesophageal reflux disease without esophagitis  Symptoms stable and well controlled   Continue Protonix 40 mg daily     Male hypogonadism  -     Testosterone,Total; Future; Expected date: 04/30/2025  Check testosterone level   Adjust current testosterone supplement if indicated     Return to clinic 3 months or sooner as needed

## 2025-05-01 ENCOUNTER — RESULTS FOLLOW-UP (OUTPATIENT)
Dept: INTERNAL MEDICINE | Facility: CLINIC | Age: 55
End: 2025-05-01

## 2025-05-01 LAB
C TRACH DNA SPEC QL NAA+PROBE: NOT DETECTED
CTGC SOURCE (OHS) ORD-325: NORMAL
N GONORRHOEA DNA UR QL NAA+PROBE: NOT DETECTED

## 2025-05-02 LAB
SPECIMEN SOURCE: NORMAL
T VAGINALIS RRNA SPEC QL NAA+PROBE: NEGATIVE

## 2025-05-28 DIAGNOSIS — R41.840 DIFFICULTY CONCENTRATING: ICD-10-CM

## 2025-06-02 RX ORDER — ATOMOXETINE 80 MG/1
80 CAPSULE ORAL DAILY
Qty: 30 CAPSULE | Refills: 0 | Status: SHIPPED | OUTPATIENT
Start: 2025-06-02